# Patient Record
Sex: FEMALE | Race: BLACK OR AFRICAN AMERICAN | URBAN - METROPOLITAN AREA
[De-identification: names, ages, dates, MRNs, and addresses within clinical notes are randomized per-mention and may not be internally consistent; named-entity substitution may affect disease eponyms.]

---

## 2017-01-17 ENCOUNTER — HISTORICAL (OUTPATIENT)
Dept: CARDIOLOGY | Facility: HOSPITAL | Age: 62
End: 2017-01-17

## 2017-02-02 ENCOUNTER — HISTORICAL (OUTPATIENT)
Dept: ADMINISTRATIVE | Facility: HOSPITAL | Age: 62
End: 2017-02-02

## 2017-02-14 ENCOUNTER — HISTORICAL (OUTPATIENT)
Dept: CARDIOLOGY | Facility: HOSPITAL | Age: 62
End: 2017-02-14

## 2017-03-14 ENCOUNTER — HISTORICAL (OUTPATIENT)
Dept: CARDIOLOGY | Facility: HOSPITAL | Age: 62
End: 2017-03-14

## 2017-04-18 ENCOUNTER — HISTORICAL (OUTPATIENT)
Dept: CARDIOLOGY | Facility: HOSPITAL | Age: 62
End: 2017-04-18

## 2017-05-04 ENCOUNTER — HISTORICAL (OUTPATIENT)
Dept: ADMINISTRATIVE | Facility: HOSPITAL | Age: 62
End: 2017-05-04

## 2017-05-16 ENCOUNTER — HISTORICAL (OUTPATIENT)
Dept: CARDIOLOGY | Facility: HOSPITAL | Age: 62
End: 2017-05-16

## 2017-06-01 ENCOUNTER — HISTORICAL (OUTPATIENT)
Dept: CARDIOLOGY | Facility: HOSPITAL | Age: 62
End: 2017-06-01

## 2017-06-29 ENCOUNTER — HISTORICAL (OUTPATIENT)
Dept: CARDIOLOGY | Facility: HOSPITAL | Age: 62
End: 2017-06-29

## 2017-07-27 ENCOUNTER — HISTORICAL (OUTPATIENT)
Dept: CARDIOLOGY | Facility: HOSPITAL | Age: 62
End: 2017-07-27

## 2017-08-01 ENCOUNTER — HISTORICAL (OUTPATIENT)
Dept: CARDIOLOGY | Facility: HOSPITAL | Age: 62
End: 2017-08-01

## 2017-08-07 ENCOUNTER — HISTORICAL (OUTPATIENT)
Dept: ADMINISTRATIVE | Facility: HOSPITAL | Age: 62
End: 2017-08-07

## 2017-08-08 LAB — FINAL CULTURE: NORMAL

## 2017-08-16 ENCOUNTER — HISTORICAL (OUTPATIENT)
Dept: LAB | Facility: HOSPITAL | Age: 62
End: 2017-08-16

## 2017-08-31 ENCOUNTER — HISTORICAL (OUTPATIENT)
Dept: LAB | Facility: HOSPITAL | Age: 62
End: 2017-08-31

## 2017-10-18 ENCOUNTER — HISTORICAL (OUTPATIENT)
Dept: SURGERY | Facility: HOSPITAL | Age: 62
End: 2017-10-18

## 2018-01-22 ENCOUNTER — HISTORICAL (OUTPATIENT)
Dept: LAB | Facility: HOSPITAL | Age: 63
End: 2018-01-22

## 2018-02-11 ENCOUNTER — HISTORICAL (OUTPATIENT)
Dept: LAB | Facility: HOSPITAL | Age: 63
End: 2018-02-11

## 2018-02-22 ENCOUNTER — HISTORICAL (OUTPATIENT)
Dept: ADMINISTRATIVE | Facility: HOSPITAL | Age: 63
End: 2018-02-22

## 2018-02-26 ENCOUNTER — HISTORICAL (OUTPATIENT)
Dept: ADMINISTRATIVE | Facility: HOSPITAL | Age: 63
End: 2018-02-26

## 2018-03-01 ENCOUNTER — HISTORICAL (OUTPATIENT)
Dept: ADMINISTRATIVE | Facility: HOSPITAL | Age: 63
End: 2018-03-01

## 2018-05-11 ENCOUNTER — HISTORICAL (OUTPATIENT)
Dept: LAB | Facility: HOSPITAL | Age: 63
End: 2018-05-11

## 2018-06-28 ENCOUNTER — HISTORICAL (OUTPATIENT)
Dept: CARDIOLOGY | Facility: HOSPITAL | Age: 63
End: 2018-06-28

## 2018-07-02 ENCOUNTER — HISTORICAL (OUTPATIENT)
Dept: CARDIOLOGY | Facility: HOSPITAL | Age: 63
End: 2018-07-02

## 2018-07-11 ENCOUNTER — HISTORICAL (OUTPATIENT)
Dept: CARDIOLOGY | Facility: HOSPITAL | Age: 63
End: 2018-07-11

## 2018-07-31 ENCOUNTER — HISTORICAL (OUTPATIENT)
Dept: CARDIOLOGY | Facility: HOSPITAL | Age: 63
End: 2018-07-31

## 2018-08-07 ENCOUNTER — HISTORICAL (OUTPATIENT)
Dept: CARDIOLOGY | Facility: HOSPITAL | Age: 63
End: 2018-08-07

## 2018-08-22 ENCOUNTER — HISTORICAL (OUTPATIENT)
Dept: CARDIOLOGY | Facility: HOSPITAL | Age: 63
End: 2018-08-22

## 2018-09-06 ENCOUNTER — HISTORICAL (OUTPATIENT)
Dept: CARDIOLOGY | Facility: HOSPITAL | Age: 63
End: 2018-09-06

## 2018-09-06 ENCOUNTER — HISTORICAL (OUTPATIENT)
Dept: HEMATOLOGY/ONCOLOGY | Facility: CLINIC | Age: 63
End: 2018-09-06

## 2018-09-10 ENCOUNTER — HISTORICAL (OUTPATIENT)
Dept: CARDIOLOGY | Facility: HOSPITAL | Age: 63
End: 2018-09-10

## 2018-09-10 ENCOUNTER — HISTORICAL (OUTPATIENT)
Dept: RADIOLOGY | Facility: HOSPITAL | Age: 63
End: 2018-09-10

## 2018-09-11 ENCOUNTER — HISTORICAL (OUTPATIENT)
Dept: INFUSION THERAPY | Facility: HOSPITAL | Age: 63
End: 2018-09-11

## 2018-09-12 ENCOUNTER — HISTORICAL (OUTPATIENT)
Dept: CARDIOLOGY | Facility: HOSPITAL | Age: 63
End: 2018-09-12

## 2018-09-13 ENCOUNTER — HISTORICAL (OUTPATIENT)
Dept: INFUSION THERAPY | Facility: HOSPITAL | Age: 63
End: 2018-09-13

## 2018-09-13 LAB — FINAL CULTURE: NORMAL

## 2018-09-17 LAB
FINAL CULTURE: NORMAL
FINAL CULTURE: NORMAL
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
## 2022-04-30 NOTE — PROGRESS NOTES
Patient:   Katerin Franco            MRN: [id number]            FIN: [id number]               Age:   63 years     Sex:  Female     :  1955   Associated Diagnoses:   None   Author:   Kina Caicedo      Patient presented for scheduled PRBC transfusion today. She was noted to be hypotensive 85/61, tachycardic with HR of 137 bpm along with a fever of 101.1. Recent blood cultures negative. Urine was considered a contaminant. She was started on IV fluids which did not improve her BP or HR. Dr. Petty notified and recommended patient go to [facility] ED for evaluation to rule out sepsis/infection. Patient and son were notified of plan of care and agreed. Patient was transported to [facility] via wheelchair.

## 2022-04-30 NOTE — PROGRESS NOTES
Patient:   Katerin Franco            MRN: [id number]            FIN: [id number]               Age:   63 years     Sex:  Female     :  1955   Associated Diagnoses:   Malignant neoplasm of lower lobe, right bronchus or lung; Anemia   Author:   Kina Caicedo      Referring physician: Dr. Rachana Madrigal  Reason for referral: Lung Cancer         Visit Information      1. Recurrence/Progression Right Lung 2017  NSCLC--Squamous Cell Carcinoma RLL Stage IIB (A6gL3T0)diagnosed 2016  Biopsy/pathology: CT-guided biopsy right lung lesion done 18--poorly differentiated squamous cell carcinoma.   Imagin. CT chest w/o contrast 2016--RLL mass measures 6X3.5X5.2cm with spiculated margins, highly suspicious for malignancy, 8mm pleural based nodule RUL.  2. PET/CT 16--Nasopharyngeal hypermetabolic mass with SUV 7.6, bilateral tonsillar hypermetabolism has SUV 7.3 on right and 7.8 on left, Posterior superior RLL 6.9X3.6cm subpleural mass with SUV 30.8, posterior RUL subpleural density not hypermetabolic, right hilar 2X1.2cm node SUV 9.1, otherwise no disease.  3. PET/CT 2016--presumed postradiation pneumonitis changes in right perihilar regions, prior RLL mass and right hilar adenopathy are no longer discretely seen.  4. PET/CT 2017--Since prior scan, patient has developed 2 discrete hypermetabolic foci w/n right perihilar midlung zone which probably resides w/n posterior segement RUL, anatomy is difficult given the degree of volume loss and consolidation, above the interval of metabolic uptake there is region of less intense metabolic activity similar to prior PET and favored to reflect post-radiation scarring predominantly.  5. CT chest 2017--RUL and RLL airspace consolidation again noted, increased subpleural nodular consolidation RLL from prior exams, stable mediastinal lymphadenopathy, mild interstitial thickening and ground glass opacity in lingula segment  6.  PET/CT 10/5/17--since the comparative study there is a better defined region of approximate 5cm intensely hypermetabolic activity w/n the right midlung zone posteriorly c/w residual, viable tumor, above this, there is diffuse consolidation rhgouthout the more cranial aspect of upper lobe presumably reflecting post-radiation change.  7. PET/CT 12/20/2017--mildly decreasing hypermetabolic activity in RLL, new small focus of hypermetabolic activity along right cardiac border, which could represent pleural metastasis, no correlating discrete measurable lesion on low-dose CT imaging.  8. PET/CT 4/16/18--stable consolidative density with air bronchograms and intense hypermetabolic uptake in right lung, RLL SUV 40.  9. MRI of brain on 9/10/18--Mild chronic microangiopathic ischemia. No acute brain findings or metastatic evidence.  10. PET/CT on 9/10/18--Stable intensely hypermetabolic involvement of the superior segment of the right lower lobe with consolidative changes. The overall appearance is not significantly changed. Likely extrapleural extension with sclerotic changes of the adjacent ribs and hypermetabolic focus within the seventh posterior right rib. No evidence for hypermetabolic mediastinal lymphadenopathy, however, borderline enlarged right paratracheal lymph node/precarinal lymph node is again identified and not significantly changed. Recommend attention on follow-up imaging.    2. h/o Follicular Lymphoma 2010    3. 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 Anemia    Procedures:  EGD Dr. Vazquez Cabrera done 8/15/18--Normal, no source for bleeding.    Treatment history:  1. Rituximab/Fludarabine/Novantron and Decadron completed in 2010.  2. Weekly Carboplatin/Taxol and Radiation completed 7/28/16--9/15/2016.    Current treatment plan: Opdivo every 2 weeks started 4/2017.   Plan to restart on 9/20/18.       Visit type:  Scheduled follow-up.    Accompanied by:  Daughter.       Chief Complaint   9/11/2018 13:52 CDT       decreased appetite        Interval History   Current complaint: Patient presents for scheduled follow-up of her NSCLC. Her daughter is with her today. She complains of weakness and decreased appetite. Recent MRI of brain and PET/CT showed stable disease. However, she is very hypotensive today with shireen BP of 80/58. She is off all BP medications but does take Lasix 20mg daily. Noted 6 lb weight loss since her last appointment as well. Her labs also show worsening anemia with Hgb of 8.0 g/dL. Last week she refused a blood transfusion due to worsening anemia. She did agree to a transfusion this week. Remains on NC @ 4L continuous. Denies any worsening SOB. Her daughter mentions she stays in bed all day. She was recently started on Norco for right-sided chest discomfort and this is helping. No other problems reported.      Review of Systems   Constitutional:  Weakness, Fatigue, Loss of appetite, Weight loss, No fever, No chills.    Eye:  No recent visual problem.    Ear/Nose/Mouth/Throat:  No decreased hearing, No nasal congestion, No sore throat.    Respiratory:  Shortness of breath, Cough, Pleuritic pain, No wheezing.    Cardiovascular:  No chest pain, No palpitations, No peripheral edema.    Gastrointestinal:  No nausea, No vomiting, No diarrhea, No constipation, No abdominal pain.    Hematology/Lymphatics:  No bruising tendency, No bleeding tendency.    Musculoskeletal:  No back pain, No joint pain.    Integumentary:  No rash, No skin lesion.    Neurologic:  +tremors, No confusion, No headache.    Psychiatric:  No anxiety, No depression.    All other systems are negative      Health Status   Allergies:    Allergic Reactions (Selected)  No Known Allergies   Current medications:  (Selected)   Prescriptions  Prescribed  Coumadin 3 mg oral tablet: 3 mg = 1 tab(s), Oral, Daily, # 30 tab(s), 0 Refill(s)  Linzess 145 mcg oral capsule: 145 mcg = 1 cap(s), Oral, Daily, # 30 cap(s), 0 Refill(s)  Norco 10 mg-325 mg oral  tablet: 1 tab(s), Oral, q6hr, PRN PRN as needed for pain, # 120 tab(s), 0 Refill(s), Pharmacy: [facility] [place]  [place] 5 mg-325 mg oral tablet: 1 tab(s), Oral, q4hr, PRN PRN as needed for pain, # 90 tab(s), 0 Refill(s), Pharmacy: [facility] [place]  Documented Medications  Documented  Citalopram Hbr 20mg Tablet: 20 mg = 1 tab(s), Oral, qAM  Furosemide 40 Mg Tablet: 40 mg = 1 tab(s), Oral, Daily  Memantine Hcl Er 14 Mg Capsule: 14 mg = 1 cap(s), Oral, qPM  Potassium Chl 10 Meq Er Tab: 30 mEq = 3 tab(s), Oral, Daily  Silenor 6 mg oral tablet: 6 mg = 1 tab(s), Oral, At Bedtime  Slow Release Iron (as elemental iron) 45 mg oral tablet, extended release: 0 Refill(s)  Vitamin B12 2500 mcg sublingual tablet: 0 Refill(s)  Vitamin D3: 0 Refill(s)  buPROPion 100 mg/12 hours (SR) oral tablet, extended release: 200 mg = 2 tab(s), Oral, BID  donepezil 10 mg oral tablet: 10 mg = 1 tab(s), Oral, Daily, 0 Refill(s)  folic acid 1 mg oral tablet: Daily, 0 Refill(s)  risperidone 1 mg oral tablet: 1 mg = 1 tab(s), Oral, Daily      Histories   Past Medical History:    Active  HTN - Hypertension ([id number])  COPD (chronic obstructive pulmonary disease) ([id number])  CHF (congestive heart failure) ([id number])  Resolved  History of DVT (deep vein thrombosis) ([id number]):  Resolved.  History of cancer ([id number]):  Resolved.   Family History:    Primary malignant neoplasm of breast  Sister ()  Primary malignant neoplasm of lung  Mother  Diabetes mellitus type 2  Mother  Brother  Sister ()  Hypertension.  Mother     Procedure history:    Esophagogastroduodenoscopy on 8/15/2018 at 63 Years.  Comments:  8/15/2018 12:06 - Bhargav FANG, Kala Schulte  auto-populated from documented surgical case  Catheter Insertion Mediport (.) on 10/18/2017 at 62 Years.  Comments:  10/18/2017 09:51 - Shree  David FANG  auto-populated from documented surgical case  Polypectomy on 5/21/2015 at 59 Years.  Comments:  5/21/2015 09:Mayra Cook RN  auto-populated from documented surgical case  Colonoscopy on 5/21/2015 at 59 Years.  Comments:  5/21/2015 09:Mayra Cook RN  auto-populated from documented surgical case  Hysterectomy ([id number]).   Social History        Alcohol  Comment: Denies alcohol use.     Employment/School  Status: Unemployed    Home/Environment  Lives with: Children  Comment: Lives at home with son.    Substance Abuse  Denies Substance Abuse    Tobacco  Use: Former smoker  Type: Cigarettes  Comment: quit feb 2014.        Physical Examination   Vital Signs   9/11/2018 13:52 CDT      Peripheral Pulse Rate     116 bpm  HI                             SpO2                      92 %  LOW     BP on right arm: 65/38  left arm: 65/53    Rashad BP in right arm: 80/58   General:  Alert and oriented, No acute distress, chronically ill-appearing black female.    Eye:  Vision unchanged.    HENT:  Normocephalic, Normal hearing, Oral mucosa is moist.    Neck:  Supple, No jugular venous distention, No lymphadenopathy, No thyromegaly.    Respiratory:  Breath sounds are equal, NC @ 4L.         Breath sounds: Right, Diminished, Diminished on left as well.    Cardiovascular:  116  beats per minute, Regular rhythm, No murmur, No gallop, Tachycardia, Normal peripheral perfusion, No edema.    Gastrointestinal:  Soft, Non-tender, Non-distended, Normal bowel sounds, No organomegaly.    Lymphatics:  No lymphadenopathy neck, axilla, groin.    Musculoskeletal:  Normal range of motion, Normal strength, No deformity, Normal gait.    Integumentary:  Warm, Intact.    Neurologic:  Alert, Oriented, Normal sensory, Normal motor function.    Psychiatric:  Cooperative, Appropriate mood & affect.    Cognition and Speech:  Oriented, Speech clear and coherent.    ECOG Performance Scale: 2 - Capable of all self-care but unable  to carry out any work activities. Up and about greater than 50 percent of waking hours.      Review / Management   Results review:  Lab results   9/11/2018 14:33 CDT      WBC                       13.8 x10(3)/mcL  HI                             RBC                       2.76 x10(6)/mcL  LOW                             Hgb                       8.0 gm/dL  LOW                             Hct                       28.1 %  LOW                             Platelet                  320 x10(3)/mcL                             MCV                       101.8 fL  HI                             MCH                       29.0 pg                             MCHC                      28.5 gm/dL  LOW                             RDW                       15.1 %                             MPV                       9.4 fL                             Abs Neut                  12.15 x10(3)/mcL  HI                             NEUT%                     88.3 %  NA                             NEUT#                     12.2 x10(3)/mcL  HI                             LYMPH%                    5.5 %  NA                             LYMPH#                    0.8 x10(3)/mcL                             MONO%                     5.5 %  NA                             MONO#                     0.8 x10(3)/mcL                             EOS%                      0.6 %  NA                             EOS#                      0.1 x10(3)/mcL                             BASO%                     0.1 %  NA                             BASO#                     0.0 x10(3)/mcL  .       Impression and Plan   Diagnosis     Malignant neoplasm of lower lobe, right bronchus or lung (ETF16-IP C34.31).     Anemia (BOC79-OC D64.9).     Plan:  Patient with NSCLC--squamous cell carcinoma diagnosed in 2016 and treated with Carboplatin/Taxol and radiation.  Recurrence/progression in right lung in 4/2017 and on Opdivo since that time.  Most recent PET from 4/2018 at  [facility] stable.    Last dose of Opdivo was 8/23/18.  MRI of brain on 9/10/18--Mild chronic microangiopathic ischemia. No acute brain findings or metastatic evidence.  PET/CT on 9/10/18--Stable intensely hypermetabolic involvement of the superior segment of the right lower lobe with consolidative changes. The overall appearance is not significantly changed. Likely extrapleural extension with sclerotic changes of the adjacent ribs and hypermetabolic focus within the seventh posterior right rib. No evidence for hypermetabolic mediastinal lymphadenopathy, however, borderline enlarged right paratracheal lymph node/precarinal lymph node is again identified and not significantly changed. Recommend attention on follow-up imaging.  Patient very dehydrated today. BP of 80/58 mmHg. Will give 1 liter of Normal saline here in clinic and reassess. After receiving 200 mLs BP now 99/66 mmHg and HR is 95 bpm.   Instructed her to HOLD the Lasix for now.  Labs show worsening anemia with Hgb of 8.0 g/dL. Will type/crossmatch and transfuse 2 units of PRBC on Thursday (daughter request).   Will also obtain urine for UA with reflex to culture and blood cultures x 2 today.   Plan to restart her Opdivo next week on 9/20/18.   Follow-up in 2 weeks with labs.   Continue Norco prescribed for pain.    All questions answered at this time.        TATE Castillo